# Patient Record
Sex: MALE | Race: AMERICAN INDIAN OR ALASKA NATIVE
[De-identification: names, ages, dates, MRNs, and addresses within clinical notes are randomized per-mention and may not be internally consistent; named-entity substitution may affect disease eponyms.]

---
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
## 2018-09-14 ENCOUNTER — HOSPITAL ENCOUNTER (EMERGENCY)
Dept: HOSPITAL 5 - ED | Age: 53
Discharge: HOME | End: 2018-09-14
Payer: COMMERCIAL

## 2018-09-14 VITALS — DIASTOLIC BLOOD PRESSURE: 76 MMHG | SYSTOLIC BLOOD PRESSURE: 136 MMHG

## 2018-09-14 DIAGNOSIS — K64.9: Primary | ICD-10-CM

## 2018-09-14 LAB
BASOPHILS # (AUTO): 0 K/MM3 (ref 0–0.1)
BASOPHILS NFR BLD AUTO: 0.2 % (ref 0–1.8)
BUN SERPL-MCNC: 11 MG/DL (ref 9–20)
BUN/CREAT SERPL: 11 %
CALCIUM SERPL-MCNC: 9 MG/DL (ref 8.4–10.2)
EOSINOPHIL # BLD AUTO: 0.2 K/MM3 (ref 0–0.4)
EOSINOPHIL NFR BLD AUTO: 4.9 % (ref 0–4.3)
HCT VFR BLD CALC: 44.3 % (ref 35.5–45.6)
HEMOLYSIS INDEX: 11
HGB BLD-MCNC: 14.8 GM/DL (ref 11.8–15.2)
LYMPHOCYTES # BLD AUTO: 1.6 K/MM3 (ref 1.2–5.4)
LYMPHOCYTES NFR BLD AUTO: 32.7 % (ref 13.4–35)
MCH RBC QN AUTO: 30 PG (ref 28–32)
MCHC RBC AUTO-ENTMCNC: 33 % (ref 32–34)
MCV RBC AUTO: 90 FL (ref 84–94)
MONOCYTES # (AUTO): 0.4 K/MM3 (ref 0–0.8)
MONOCYTES % (AUTO): 7.8 % (ref 0–7.3)
PLATELET # BLD: 259 K/MM3 (ref 140–440)
RBC # BLD AUTO: 4.96 M/MM3 (ref 3.65–5.03)

## 2018-09-14 PROCEDURE — 36415 COLL VENOUS BLD VENIPUNCTURE: CPT

## 2018-09-14 PROCEDURE — 85025 COMPLETE CBC W/AUTO DIFF WBC: CPT

## 2018-09-14 PROCEDURE — 80048 BASIC METABOLIC PNL TOTAL CA: CPT

## 2018-09-14 PROCEDURE — 99283 EMERGENCY DEPT VISIT LOW MDM: CPT

## 2018-09-14 NOTE — EMERGENCY DEPARTMENT REPORT
HPI





- General


Chief Complaint: GI Bleed


Time Seen by Provider: 09/14/18 11:49





- HPI


HPI: 


53-year-old -American male presents to the emergency department with a 

complaint of some rectal bleeding of bright red blood.  He says that he has 

noticed it intermittently over the past 6 months but he had one episode each of 

the last 2 mornings.  This morning he says there was a moderate amount of 

bleeding.  He has no abdominal or rectal pain.  He says that he used to notice 

it occurring the morning after he drank some alcohol.  He denies any past 

medical history.  He has not taken any medications for his symptoms prior to 

presentation.








ED Past Medical Hx





- Past Medical History


Previous Medical History?: No





- Surgical History


Past Surgical History?: No





- Social History


Smoking Status: Never Smoker


Substance Use Type: Alcohol





- Medications


Home Medications: 


 Home Medications











 Medication  Instructions  Recorded  Confirmed  Last Taken  Type


 


HYDROcodone/APAP 5-325 [[place] 1 each PO Q6HR PRN #15 tablet 05/16/18  Unknown Rx





5/325]     


 


Ibuprofen [Motrin] 600 mg PO Q8H PRN #20 tablet 05/16/18  Unknown Rx


 


methOCARBAMOL [Robaxin TAB] 500 mg PO Q6H PRN #15 tablet 05/16/18  Unknown Rx


 


Hydrocortisone/Pramoxine 1 applicatio RC BID #1 bottle 09/14/18  Unknown Rx





[Proctofoam-Hc Foam]     














ED Review of Systems


ROS: 


Stated complaint: BLOOD IN STOOL


Other details as noted in HPI





Comment: All other systems reviewed and negative


Constitutional: denies: chills, fever


Eyes: denies: eye pain, eye discharge, vision change


ENT: denies: ear pain, throat pain


Respiratory: denies: cough, shortness of breath, wheezing


Cardiovascular: denies: chest pain, palpitations


Gastrointestinal: other (Bright red Blood per rectum).  denies: abdominal pain, 

melena


Genitourinary: denies: urgency, dysuria


Musculoskeletal: denies: back pain, joint swelling, arthralgia


Skin: denies: rash, lesions


Neurological: denies: headache, weakness, paresthesias





Physical Exam





- Physical Exam


Vital Signs: 


 Vital Signs











  09/14/18





  09:43


 


Temperature 98.5 F


 


Pulse Rate 82


 


Respiratory 18





Rate 


 


Blood Pressure 136/85


 


O2 Sat by Pulse 98





Oximetry 











Physical Exam: 





GENERAL: The patient is well-developed well-nourished.


HENT: Normocephalic.  Atraumatic.    Patient has moist mucous membranes.


EYES: Extraocular motions are intact.


NECK: Supple.  Trachea is midline.


CHEST/LUNGS: Clear to auscultation.  There is no respiratory distress noted.


HEART/CARDIOVASCULAR: Regular.  There is no tachycardia.  There is no murmur.


ABDOMEN: Abdomen is soft, nontender.  Patient has normal bowel sounds.  There 

is no abdominal distention.


SKIN: Skin is warm and dry.


NEURO: The patient is awake, alert, and oriented.  The patient is cooperative.  

The patient has no focal neurologic deficits.  The patient has normal speech 

and gait.


MUSCULOSKELETAL: There is no tenderness or deformity.  There is no limitation 

range of motion.  There is no evidence of acute injury.


RECTAL: There is no gross blood seen.  The patient has a nonthrombosed external 

hemorrhoid at the 3 o'clock position that is about 1 cm in diameter.  There was 

only a small amount of stool obtained on rectal examination but was positive on 

guaiac testing.





ED Course


 Vital Signs











  09/14/18





  09:43


 


Temperature 98.5 F


 


Pulse Rate 82


 


Respiratory 18





Rate 


 


Blood Pressure 136/85


 


O2 Sat by Pulse 98





Oximetry 














- Reevaluation(s)


Reevaluation #1: 





09/14/18 12:47


Rectal examination was done with neva Franco at bedside as a chaperone





ED Medical Decision Making





- Lab Data


Result diagrams: 


 09/14/18 12:01





 09/14/18 12:01





- Medical Decision Making





The patient presents with the complaint of rectal bleeding that has been going 

on intermittently for 6 months but increased over the past few days.  No 

abdominal or rectal pains.  Vital signs stable including being afebrile.  Labs 

are unremarkable including no signs of anemia or any leukocytosis.  On 

examination the patient has a nonthrombosed external hemorrhoid and will be 

treated with some Proctofoam.  However he understands that despite this 

hemorrhoid being the most likely reason for the rectal bleeding, that he will 

need to follow-up with a gastroenterologist and most likely have a colonoscopy 

in the near future for a colon cancer screening and secondary to the rectal 

bleeding.  He will return to the ER with any worsening of his symptoms or any 

acute distress.





- Differential Diagnosis


hemorrhoid, anal fissure, malignancy, diverticulosis


Critical Care Time: No


Critical care attestation.: 


If time is entered above; I have spent that time in minutes in the direct care 

of this critically ill patient, excluding procedure time.








ED Disposition


Clinical Impression: 


 Rectal bleeding





Hemorrhoid


Qualifiers:


 Hemorrhoid type: unspecified Qualified Code(s): K64.9 - Unspecified hemorrhoids





Disposition: DC-01 TO HOME OR SELFCARE


Is pt being admited?: No


Condition: Stable


Instructions:  Hemorrhoids (ED), Rectal Bleeding (ED)


Additional Instructions: 


As we talked about, despite the fact that you have a hemorrhoid and it may be 

the cause of the rectal bleeding, it is imperative that you follow-up with a 

gastroenterologist and she may need a colonoscopy in the near future.  I have 

given you a referral for a local gastroenterologist, Dr. Ybarra, who is part of 

a larger gastroenterology group.  Return to the emergency Department with any 

worsening of your symptoms or any acute distress.


Prescriptions: 


Hydrocortisone/Pramoxine [Proctofoam-Hc Foam] 1 applicatio RC BID #1 bottle


Referrals: 


JACQUE YBARRA MD [Staff Physician] - 2-3 Days


PRIMARY CARE,MD [Primary Care Provider] - 2-3 Days


Forms:  Accompanied Note


Time of Disposition: 12:51

## 2021-01-12 ENCOUNTER — HOSPITAL ENCOUNTER (EMERGENCY)
Dept: HOSPITAL 5 - ED | Age: 56
Discharge: HOME | End: 2021-01-12
Payer: SELF-PAY

## 2021-01-12 VITALS — DIASTOLIC BLOOD PRESSURE: 90 MMHG | SYSTOLIC BLOOD PRESSURE: 143 MMHG

## 2021-01-12 DIAGNOSIS — Z79.1: ICD-10-CM

## 2021-01-12 DIAGNOSIS — K04.7: Primary | ICD-10-CM

## 2021-01-12 DIAGNOSIS — Z79.2: ICD-10-CM

## 2021-01-12 PROCEDURE — 99282 EMERGENCY DEPT VISIT SF MDM: CPT

## 2021-01-12 NOTE — EMERGENCY DEPARTMENT REPORT
ED ENT HPI





- General


Chief complaint: Dental/Oral


Stated complaint: TOOTHACHE


Time Seen by Provider: 01/12/21 15:07


Source: patient


Mode of arrival: Ambulatory


Limitations: No Limitations





- History of Present Illness


Initial comments: 


55 yr old male presents to ED c/o dental pain. Pt states that for the past 3 

days he has been having pain to entire lower front teeth. He reports associated 

swelling. He states he has been taking tylenol without relief. He denies any 

injury. He reports no difficulty swallowing, trismus drooling or any other 

symptoms. 





MD complaint: tooth pain


-: Gradual, days(s) (3)





- Related Data


                                  Previous Rx's











 Medication  Instructions  Recorded  Last Taken  Type


 


Acetaminophen/Codeine [Tylenol 1 tab PO Q4HR PRN #12 tablet 01/12/21 Unknown Rx





/Codeine # 3 tab]    


 


Amoxicillin [Trimox CAP] 500 mg PO Q12H #20 capsule 01/12/21 Unknown Rx


 


Ibuprofen [Motrin] 800 mg PO Q8HR PRN #30 tablet 01/12/21 Unknown Rx











                                    Allergies











Allergy/AdvReac Type Severity Reaction Status Date / Time


 


No Known Allergies Allergy   Unverified 05/16/18 12:35














ED Dental HPI





- General


Chief complaint: Dental/Oral


Stated complaint: TOOTHACHE


Time Seen by Provider: 01/12/21 15:07


Source: patient


Mode of arrival: Ambulatory


Limitations: No Limitations





- Related Data


                                  Previous Rx's











 Medication  Instructions  Recorded  Last Taken  Type


 


Acetaminophen/Codeine [Tylenol 1 tab PO Q4HR PRN #12 tablet 01/12/21 Unknown Rx





/Codeine # 3 tab]    


 


Amoxicillin [Trimox CAP] 500 mg PO Q12H #20 capsule 01/12/21 Unknown Rx


 


Ibuprofen [Motrin] 800 mg PO Q8HR PRN #30 tablet 01/12/21 Unknown Rx











                                    Allergies











Allergy/AdvReac Type Severity Reaction Status Date / Time


 


No Known Allergies Allergy   Unverified 05/16/18 12:35














ED Review of Systems


ROS: 


Stated complaint: TOOTHACHE


Other details as noted in HPI





Comment: All other systems reviewed and negative


Constitutional: denies: chills, fever


ENT: dental pain.  denies: ear pain, throat pain, congestion


Respiratory: denies: cough, shortness of breath, wheezing


Cardiovascular: denies: chest pain, palpitations


Gastrointestinal: denies: abdominal pain, nausea, diarrhea


Genitourinary: denies: urgency, dysuria


Musculoskeletal: denies: back pain, joint swelling, arthralgia


Skin: denies: rash, lesions


Neurological: denies: headache, weakness, paresthesias


Psychiatric: denies: anxiety, depression





ED Past Medical Hx





- Past Medical History


Previous Medical History?: No





- Surgical History


Past Surgical History?: No





- Social History


Smoking Status: Never Smoker


Substance Use Type: None





- Medications


Home Medications: 


                                Home Medications











 Medication  Instructions  Recorded  Confirmed  Last Taken  Type


 


Acetaminophen/Codeine [Tylenol 1 tab PO Q4HR PRN #12 tablet 01/12/21  Unknown Rx





/Codeine # 3 tab]     


 


Amoxicillin [Trimox CAP] 500 mg PO Q12H #20 capsule 01/12/21  Unknown Rx


 


Ibuprofen [Motrin] 800 mg PO Q8HR PRN #30 tablet 01/12/21  Unknown Rx














ED Physical Exam





- General


Limitations: No Limitations


General appearance: alert, in no apparent distress





- Head


Head exam: Present: atraumatic, normocephalic, normal inspection





- Eye


Eye exam: Present: normal appearance, PERRL, EOMI





- ENT


ENT exam: Present: normal exam, mucous membranes moist, other (There is Mod ttp 

to gum associated with right first premolar with associated mild swelling and 

fluctuance. No external jaw swelling or cellulitis noted. No trismus or 

drooling. )





- Neck


Neck exam: Present: normal inspection, full ROM.  Absent: tenderness





- Respiratory


Respiratory exam: Present: normal lung sounds bilaterally





- Cardiovascular


Cardiovascular Exam: Present: regular rate, normal rhythm, normal heart sounds





- Neurological Exam


Neurological exam: Present: alert, oriented X3, CN II-XII intact





- Psychiatric


Psychiatric exam: Present: normal affect, normal mood





- Skin


Skin exam: Present: intact





ED Course


                                   Vital Signs











  01/12/21





  13:02


 


Temperature 98.1 F


 


Pulse Rate 69


 


Respiratory 18





Rate 


 


Blood Pressure 143/90


 


O2 Sat by Pulse 98





Oximetry 











Critical care attestation.: 


If time is entered above; I have spent that time in minutes in the direct care 

of this critically ill patient, excluding procedure time.








ED Disposition


Clinical Impression: 


 Dental abscess





Disposition: DC-01 TO HOME OR SELFCARE


Is pt being admited?: No


Does the pt Need Aspirin: No


Condition: Stable


Instructions:  Dental Abscess


Additional Instructions: 


take the medications as prescribed. Follow up with the dentist as discussed. 

Return to ED if symptoms changes or worsens. 


Prescriptions: 


Ibuprofen [Motrin] 800 mg PO Q8HR PRN #30 tablet


 PRN Reason: Pain , Severe (7-10)


Amoxicillin [Trimox CAP] 500 mg PO Q12H #20 capsule


Acetaminophen/Codeine [Tylenol /Codeine # 3 tab] 1 tab PO Q4HR PRN #12 tablet


 PRN Reason: Pain


Referrals: 


SAMAN MARTINEZ MD [Staff Physician] - 3-5 Days


Time of Disposition: 15:39